# Patient Record
Sex: MALE | Race: WHITE | NOT HISPANIC OR LATINO
[De-identification: names, ages, dates, MRNs, and addresses within clinical notes are randomized per-mention and may not be internally consistent; named-entity substitution may affect disease eponyms.]

---

## 2018-03-07 PROBLEM — Z00.00 ENCOUNTER FOR PREVENTIVE HEALTH EXAMINATION: Status: ACTIVE | Noted: 2018-03-07

## 2018-04-06 ENCOUNTER — APPOINTMENT (OUTPATIENT)
Dept: NEUROLOGY | Facility: CLINIC | Age: 78
End: 2018-04-06
Payer: MEDICARE

## 2018-04-06 ENCOUNTER — FORM ENCOUNTER (OUTPATIENT)
Age: 78
End: 2018-04-06

## 2018-04-06 VITALS
HEIGHT: 70 IN | DIASTOLIC BLOOD PRESSURE: 80 MMHG | SYSTOLIC BLOOD PRESSURE: 130 MMHG | BODY MASS INDEX: 19.33 KG/M2 | WEIGHT: 135 LBS

## 2018-04-06 DIAGNOSIS — E78.5 HYPERLIPIDEMIA, UNSPECIFIED: ICD-10-CM

## 2018-04-06 DIAGNOSIS — Z78.9 OTHER SPECIFIED HEALTH STATUS: ICD-10-CM

## 2018-04-06 DIAGNOSIS — I10 ESSENTIAL (PRIMARY) HYPERTENSION: ICD-10-CM

## 2018-04-06 DIAGNOSIS — N40.0 BENIGN PROSTATIC HYPERPLASIA WITHOUT LOWER URINARY TRACT SYMPMS: ICD-10-CM

## 2018-04-06 PROCEDURE — 99204 OFFICE O/P NEW MOD 45 MIN: CPT

## 2018-04-07 ENCOUNTER — OUTPATIENT (OUTPATIENT)
Dept: OUTPATIENT SERVICES | Facility: HOSPITAL | Age: 78
LOS: 1 days | End: 2018-04-07
Payer: MEDICARE

## 2018-04-07 ENCOUNTER — APPOINTMENT (OUTPATIENT)
Dept: MRI IMAGING | Facility: CLINIC | Age: 78
End: 2018-04-07
Payer: MEDICARE

## 2018-04-07 DIAGNOSIS — F03.90 UNSPECIFIED DEMENTIA WITHOUT BEHAVIORAL DISTURBANCE: ICD-10-CM

## 2018-04-07 DIAGNOSIS — R26.9 UNSPECIFIED ABNORMALITIES OF GAIT AND MOBILITY: ICD-10-CM

## 2018-04-07 PROCEDURE — 70551 MRI BRAIN STEM W/O DYE: CPT

## 2018-04-07 PROCEDURE — 70551 MRI BRAIN STEM W/O DYE: CPT | Mod: 26

## 2018-05-22 ENCOUNTER — APPOINTMENT (OUTPATIENT)
Dept: NEUROLOGY | Facility: CLINIC | Age: 78
End: 2018-05-22
Payer: MEDICARE

## 2018-05-22 VITALS — HEART RATE: 73 BPM

## 2018-05-22 VITALS
HEIGHT: 70 IN | DIASTOLIC BLOOD PRESSURE: 82 MMHG | WEIGHT: 130 LBS | SYSTOLIC BLOOD PRESSURE: 140 MMHG | BODY MASS INDEX: 18.61 KG/M2

## 2018-05-22 PROCEDURE — 99214 OFFICE O/P EST MOD 30 MIN: CPT

## 2018-06-21 ENCOUNTER — MEDICATION RENEWAL (OUTPATIENT)
Age: 78
End: 2018-06-21

## 2018-11-12 ENCOUNTER — APPOINTMENT (OUTPATIENT)
Dept: NEUROLOGY | Facility: CLINIC | Age: 78
End: 2018-11-12
Payer: MEDICARE

## 2018-11-12 VITALS
DIASTOLIC BLOOD PRESSURE: 70 MMHG | HEIGHT: 70 IN | WEIGHT: 115 LBS | BODY MASS INDEX: 16.46 KG/M2 | SYSTOLIC BLOOD PRESSURE: 138 MMHG

## 2018-11-12 PROCEDURE — 99213 OFFICE O/P EST LOW 20 MIN: CPT

## 2018-11-20 ENCOUNTER — RX RENEWAL (OUTPATIENT)
Age: 78
End: 2018-11-20

## 2019-02-11 ENCOUNTER — APPOINTMENT (OUTPATIENT)
Dept: NEUROLOGY | Facility: CLINIC | Age: 79
End: 2019-02-11
Payer: MEDICARE

## 2019-02-11 VITALS
DIASTOLIC BLOOD PRESSURE: 90 MMHG | BODY MASS INDEX: 16.46 KG/M2 | SYSTOLIC BLOOD PRESSURE: 160 MMHG | HEIGHT: 70 IN | WEIGHT: 115 LBS

## 2019-02-11 PROCEDURE — 99213 OFFICE O/P EST LOW 20 MIN: CPT

## 2019-02-11 NOTE — ASSESSMENT
[FreeTextEntry1] : This is a 78-year-old man with dementia. I will continue donepezil 10 mg daily as his son states that his memory has been fairly stable. I've given him the name of an orthopedist that he visits his knee worsens. I will see him back in the office in 6 months, sooner should the need arise.

## 2019-02-11 NOTE — CONSULT LETTER
[Dear  ___] : Dear ~CHAPARRO, [Courtesy Letter:] : I had the pleasure of seeing your patient, [unfilled], in my office today. [Please see my note below.] : Please see my note below. [Consult Closing:] : Thank you very much for allowing me to participate in the care of this patient.  If you have any questions, please do not hesitate to contact me. [Sincerely,] : Sincerely, [FreeTextEntry3] : Aguilar Bartlett M.D., Ph.D. DPN-N\par [facility]\par Neurology at [place]\par Medical Director of Stroke Services\par [facility]\par

## 2019-02-11 NOTE — PHYSICAL EXAM
[Person] : oriented to person [Place] : oriented to place [Time] : oriented to time [Short Term Intact] : short term memory impaired [Remote Intact] : remote memory intact [Registration Intact] : recent registration memory intact [Span Intact] : the attention span was normal [Concentration Intact] : normal concentrating ability [Visual Intact] : visual attention was ~T not ~L decreased [Naming Objects] : no difficulty naming common objects [Repeating Phrases] : no difficulty repeating a phrase [Fluency] : fluency intact [Comprehension] : comprehension intact [Current Events] : inadequate knowledge of current events [Past History] : adequate knowledge of personal past history [Cranial Nerves Optic (II)] : visual acuity intact bilaterally,  visual fields full to confrontation, pupils equal round and reactive to light [Cranial Nerves Oculomotor (III)] : extraocular motion intact [Cranial Nerves Trigeminal (V)] : facial sensation intact symmetrically [Cranial Nerves Facial (VII)] : face symmetrical [Cranial Nerves Vestibulocochlear (VIII)] : hearing was intact bilaterally [Cranial Nerves Glossopharyngeal (IX)] : tongue and palate midline [Cranial Nerves Accessory (XI - Cranial And Spinal)] : head turning and shoulder shrug symmetric [Cranial Nerves Hypoglossal (XII)] : there was no tongue deviation with protrusion [Motor Tone] : muscle tone was normal in all four extremities [Motor Strength] : muscle strength was normal in all four extremities [Involuntary Movements] : no involuntary movements were seen [No Muscle Atrophy] : normal bulk in all four extremities [Motor Handedness Right-Handed] : the patient is right hand dominant [Paresis Pronator Drift Right-Sided] : no pronator drift on the right [Paresis Pronator Drift Left-Sided] : no pronator drift on the left [Sensation Tactile Decrease] : light touch was intact [Sensation Pain / Temperature Decrease] : pain and temperature was intact [Sensation Vibration Decrease] : vibration was intact [Proprioception] : proprioception was intact [Balance] : balance was intact [Tremor] : no tremor present [Coordination - Dysmetria Impaired Finger-to-Nose Bilateral] : not present [2+] : Patella left 2+ [FreeTextEntry4] : 0/3 recall, 0/3 with prompting [FreeTextEntry8] : not magnetic gait, Impaired because of right knee [Sclera] : the sclera and conjunctiva were normal [PERRL With Normal Accommodation] : pupils were equal in size, round, reactive to light, with normal accommodation [Extraocular Movements] : extraocular movements were intact [No APD] : no afferent pupillary defect [No JAIMIE] : no internuclear ophthalmoplegia [Full Visual Field] : full visual field

## 2019-02-11 NOTE — HISTORY OF PRESENT ILLNESS
[FreeTextEntry1] : Initial office visit April 6, 2018:\par This is a 77-year-old man who is here today a for evaluation of memory problems. He is accompanied by his son provides collaterals history. He is also having gait issues. His memory problems going on for at least a year but it seemed to be worse in the past few months. He forgets names forgets relationships forgets appointments he is gotten lost with driving. He can do some of his activities of daily living but not all. He has been unable to handle finances. He has limited insight to these deficits. He also has had a problem with walking but has been worse over the past week. This follows his hernia surgery. He reportedly had no complications during the surgery. He is here today for neurologic evaluation and treatment.\par \par Followup in November 12, 2018:\par This is a 78-year-old man presented for followup of memory issues. He is also having problem with gait which is now improved with physical therapy. He is here with his son today. His memory is roughly stable over the last 6 months. He is on donepezil 10 mg daily without much change. He's here today for neurologic followup.\par \par Followup February 11, 2019:\par This is a 78-year-old man who presents for followup. He is a history of dementia. He is here with his son who reports that his memory has been roughly stable since his last visit in November of 2018. He continues on donepezil 10 mg daily. He is having problems with his right knee, stating that clicking. It sounds like it's arthritic. He has had some physical therapy for it but hasn't had it recently. He is here today for neurologic followup.\par \par Followup May 22, 2018:\par This is a 77-year-old man who follows up today for dementia and gait issues. He is here with his son who states his memory has been fairly stable since his last visit. His gait is improving with physical therapy. He denies any incontinence of urine but states he has to go frequently. He had an MRI of his brain which will be referred to below. His son thinks that he is taking donepezil we did not have that on the intake sheet last month at his visit. He is here today for neurologic followup.

## 2019-02-11 NOTE — REVIEW OF SYSTEMS
[As Noted in HPI] : as noted in HPI [Confused or Disoriented] : confusion [Memory Lapses or Loss] : memory loss [Negative] : Heme/Lymph

## 2019-03-18 ENCOUNTER — APPOINTMENT (OUTPATIENT)
Dept: ORTHOPEDIC SURGERY | Facility: CLINIC | Age: 79
End: 2019-03-18
Payer: MEDICARE

## 2019-03-18 VITALS
TEMPERATURE: 98.2 F | WEIGHT: 158 LBS | DIASTOLIC BLOOD PRESSURE: 97 MMHG | HEIGHT: 70 IN | SYSTOLIC BLOOD PRESSURE: 175 MMHG | BODY MASS INDEX: 22.62 KG/M2

## 2019-03-18 DIAGNOSIS — M25.562 PAIN IN RIGHT KNEE: ICD-10-CM

## 2019-03-18 DIAGNOSIS — M17.11 UNILATERAL PRIMARY OSTEOARTHRITIS, RIGHT KNEE: ICD-10-CM

## 2019-03-18 DIAGNOSIS — M17.12 UNILATERAL PRIMARY OSTEOARTHRITIS, LEFT KNEE: ICD-10-CM

## 2019-03-18 DIAGNOSIS — M25.561 PAIN IN RIGHT KNEE: ICD-10-CM

## 2019-03-18 PROCEDURE — 99203 OFFICE O/P NEW LOW 30 MIN: CPT

## 2019-03-18 PROCEDURE — 73562 X-RAY EXAM OF KNEE 3: CPT | Mod: 50

## 2019-03-18 RX ORDER — OLMESARTAN MEDOXOMIL-HYDROCHLOROTHIAZIDE 25; 40 MG/1; MG/1
TABLET, FILM COATED ORAL
Refills: 0 | Status: ACTIVE | COMMUNITY

## 2019-03-18 NOTE — PHYSICAL EXAM
[LE] : Sensory: Intact in bilateral lower extremities [ALL] : dorsalis pedis, posterior tibial, femoral, popliteal, and radial 2+ and symmetric bilaterally [Antalgic] : antalgic [Poor Appearance] : well-appearing [Obese] : not obese [Acute Distress] : not in acute distress [de-identified] : GENERAL APPEARANCE: Well nourished and hydrated, pleasant, alert, and oriented x 3. Appears their stated age. \par HEENT: Normocephalic, extraocular eye motion intact. Nasal septum midline. Oral cavity clear. External auditory canal clear. \par RESPIRATORY: Breath sounds clear and audible in all lobes. No wheezing, No accessory muscle use.\par CARDIOVASCULAR: No apparent abnormalities. No lower leg edema. No varicosities. Pedal pulses are palpable.\par NEUROLOGIC: Sensation is normal, no muscle weakness in the upper or lower extremities.\par DERMATOLOGIC: No apparent skin lesions, moist, warm, no rash.\par SPINE: Cervical spine appears normal and moves freely; thoracic spine appears normal and moves freely; lumbosacral spine appears normal and moves freely, normal, nontender.\par MUSCULOSKELETAL: Hands, wrists, and elbows are normal and move freely, shoulders are normal and move freely.  [de-identified] : Bilateral knee exam shows ROM of 5-95 degree. slight valgus. no effusion, crepitus with ROM.\par Bilateral hip exam shows FROM, no pain with motion.\par \par  [de-identified] : 5V Xray of the bilateral done in office today and reviewed by Dr. 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

## 2019-03-18 NOTE — HISTORY OF PRESENT ILLNESS
[0] : a current pain level of 0/10 [Stable] : stable [Intermit.] : ~He/She~ states the symptoms seem to be intermittent [Sitting] : worsened by sitting [Walking] : worsened by walking [Knee Flexion] : worsened with knee flexion [de-identified] : 78 year old male presents for evaluation of bilateral knee clicking and clunking. He notes that he feels something is moving when he bends and when he gets in and out of his car. He also notes significant difficulty when attempting to stand up after sitting. He states that the pain is slight, and tolerable. The pain is currently 0/10 in severity and he denies pain today. When he has pain, it is not alleviated by anything he has attempted. He has attempted physical therapy in the past, with little relief. \par He owns a cane, but almost never uses it.  [de-identified] : stairs

## 2019-03-18 NOTE — REASON FOR VISIT
[Initial Visit] : an initial visit for [Knee Pain] : knee pain [Family Member] : family member [FreeTextEntry2] : Bilateral knee pain L>R

## 2019-03-18 NOTE — ADDENDUM
[FreeTextEntry1] : I, Aguilar Small, acted solely as a scribe for Dr. Reinier Ureña on this date 03/18/2019.

## 2019-03-18 NOTE — DISCUSSION/SUMMARY
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 We discussed the nature of the condition and the treatment options. I recommended the patient continue with exercise to maintain strength in the lower extremities. I recommended he attempt to walk frequently on flat surfaces for exercise. He can use rest and ice as needed for pain relief if he finds they help. I offered the patient cortisone injections in his bilateral knees, but he defers today.\par \par He will follow-up as needed for re-evaluation. \par \par

## 2019-11-18 ENCOUNTER — APPOINTMENT (OUTPATIENT)
Dept: NEUROLOGY | Facility: CLINIC | Age: 79
End: 2019-11-18
Payer: MEDICARE

## 2019-11-18 VITALS
DIASTOLIC BLOOD PRESSURE: 78 MMHG | BODY MASS INDEX: 24.05 KG/M2 | SYSTOLIC BLOOD PRESSURE: 130 MMHG | HEIGHT: 70 IN | WEIGHT: 168 LBS

## 2019-11-18 DIAGNOSIS — R26.9 UNSPECIFIED ABNORMALITIES OF GAIT AND MOBILITY: ICD-10-CM

## 2019-11-18 PROCEDURE — 99213 OFFICE O/P EST LOW 20 MIN: CPT

## 2019-11-18 NOTE — ASSESSMENT
[FreeTextEntry1] : This is an 79-year-old man with likely Alzheimer's dementia. He continues to have very slow memory. He will continue on donepezil 10 mg daily. On to add anything at this point. I will order home physical therapy to help with his gait. I will see him back in the office in 6 months, sooner should the need arise.

## 2019-11-18 NOTE — HISTORY OF PRESENT ILLNESS
[FreeTextEntry1] : Initial office visit April 6, 2018:\par This is a 77-year-old man who is here today a for evaluation of memory problems. He is accompanied by his son provides collaterals history. He is also having gait issues. His memory problems going on for at least a year but it seemed to be worse in the past few months. He forgets names forgets relationships forgets appointments he is gotten lost with driving. He can do some of his activities of daily living but not all. He has been unable to handle finances. He has limited insight to these deficits. He also has had a problem with walking but has been worse over the past week. This follows his hernia surgery. He reportedly had no complications during the surgery. He is here today for neurologic evaluation and treatment.\par \par Followup in November 12, 2018:\par This is a 78-year-old man presented for followup of memory issues. He is also having problem with gait which is now improved with physical therapy. He is here with his son today. His memory is roughly stable over the last 6 months. He is on donepezil 10 mg daily without much change. He's here today for neurologic followup.\par \par Followup February 11, 2019:\par This is a 78-year-old man who presents for followup. He is a history of dementia. He is here with his son who reports that his memory has been roughly stable since his last visit in November of 2018. He continues on donepezil 10 mg daily. He is having problems with his right knee, stating that clicking. It sounds like it's arthritic. He has had some physical therapy for it but hasn't had it recently. He is here today for neurologic followup.\par \par Followup May 22, 2018:\par This is a 77-year-old man who follows up today for dementia and gait issues. He is here with his son who states his memory has been fairly stable since his last visit. His gait is improving with physical therapy. He denies any incontinence of urine but states he has to go frequently. He had an MRI of his brain which will be referred to below. His son thinks that he is taking donepezil we did not have that on the intake sheet last month at his visit. He is here today for neurologic followup.\par \par Followup November 18, 2019:\par This is a 79-year-old man who presents today for followup of dementia stability. His son accompanies him as usual. His son does not report any major decline in memory over the period of time since he was last seen. He continues to have problems with walking in his knees in general. He uses a cane for balance. He is on donepezil 10 mg daily for dementia. He is here today for followup.

## 2019-11-18 NOTE — REVIEW OF SYSTEMS
[As Noted in HPI] : as noted in HPI [Confused or Disoriented] : confusion [Decr. Concentrating Ability] : decreased concentrating ability [Memory Lapses or Loss] : memory loss [Difficulty Walking] : difficulty walking [Repeating Questions] : repeated questioning about recent events [Difficulty with Language] : ~M difficulty with language [Negative] : Heme/Lymph

## 2019-11-18 NOTE — PHYSICAL EXAM
[Person] : oriented to person [Place] : oriented to place [Time] : disoriented to time [Short Term Intact] : short term memory impaired [Remote Intact] : remote memory intact [Registration Intact] : recent registration memory intact [Span Intact] : the attention span was normal [Visual Intact] : visual attention was ~T not ~L decreased [Concentration Intact] : normal concentrating ability [Naming Objects] : no difficulty naming common objects [Repeating Phrases] : no difficulty repeating a phrase [Fluency] : fluency intact [Comprehension] : comprehension intact [Current Events] : inadequate knowledge of current events [Past History] : adequate knowledge of personal past history [Cranial Nerves Optic (II)] : visual acuity intact bilaterally,  visual fields full to confrontation, pupils equal round and reactive to light [Cranial Nerves Oculomotor (III)] : extraocular motion intact [Cranial Nerves Trigeminal (V)] : facial sensation intact symmetrically [Cranial Nerves Vestibulocochlear (VIII)] : hearing was intact bilaterally [Cranial Nerves Facial (VII)] : face symmetrical [Cranial Nerves Glossopharyngeal (IX)] : tongue and palate midline [Cranial Nerves Accessory (XI - Cranial And Spinal)] : head turning and shoulder shrug symmetric [Cranial Nerves Hypoglossal (XII)] : there was no tongue deviation with protrusion [Involuntary Movements] : no involuntary movements were seen [Motor Tone] : muscle tone was normal in all four extremities [Motor Strength] : muscle strength was normal in all four extremities [No Muscle Atrophy] : normal bulk in all four extremities [Motor Handedness Right-Handed] : the patient is right hand dominant [Motor Strength Upper Extremities Bilaterally] : strength was normal in both upper extremities [Paresis Pronator Drift Right-Sided] : no pronator drift on the right [Paresis Pronator Drift Left-Sided] : no pronator drift on the left [Motor Strength Lower Extremities Bilaterally] : strength was normal in both lower extremities [Sensation Tactile Decrease] : light touch was intact [Sensation Pain / Temperature Decrease] : pain and temperature was intact [Sensation Vibration Decrease] : vibration was intact [Proprioception] : proprioception was intact [Balance] : balance was intact [Tremor] : no tremor present [Coordination - Dysmetria Impaired Finger-to-Nose Bilateral] : not present [2+] : Patella right 2+ [FreeTextEntry4] : 0/3 recall, 0/3 with prompting [FreeTextEntry8] : not magnetic gait, Impaired because of right knee using cane [Sclera] : the sclera and conjunctiva were normal [PERRL With Normal Accommodation] : pupils were equal in size, round, reactive to light, with normal accommodation [Extraocular Movements] : extraocular movements were intact [No APD] : no afferent pupillary defect [No JAIMIE] : no internuclear ophthalmoplegia [Full Visual Field] : full visual field

## 2019-11-18 NOTE — CONSULT LETTER
[Dear  ___] : Dear  [unfilled], [Please see my note below.] : Please see my note below. [Courtesy Letter:] : I had the pleasure of seeing your patient, [unfilled], in my office today. [Consult Closing:] : Thank you very much for allowing me to participate in the care of this patient.  If you have any questions, please do not hesitate to contact me. [Sincerely,] : Sincerely, [FreeTextEntry3] : Aguilar Bartlett M.D., Ph.D. DPN-N\par [facility]\par Neurology at [place]\par Medical Director of Stroke Services\par [facility]\par

## 2020-05-18 ENCOUNTER — APPOINTMENT (OUTPATIENT)
Dept: NEUROLOGY | Facility: CLINIC | Age: 80
End: 2020-05-18

## 2020-05-18 ENCOUNTER — APPOINTMENT (OUTPATIENT)
Dept: NEUROLOGY | Facility: CLINIC | Age: 80
End: 2020-05-18
Payer: MEDICARE

## 2020-05-18 PROCEDURE — 99213 OFFICE O/P EST LOW 20 MIN: CPT | Mod: 95

## 2020-05-18 NOTE — HISTORY OF PRESENT ILLNESS
[Home] : at home, [unfilled] , at the time of the visit. [Medical Office: ([facility])___] : at the medical office located in  [Other:____] : [unfilled] [FreeTextEntry2] : his son [FreeTextEntry1] : Initial office visit April 6, 2018:\par This is a 77-year-old man who is here today a for evaluation of memory problems. He is accompanied by his son provides collaterals history. He is also having gait issues. His memory problems going on for at least a year but it seemed to be worse in the past few months. He forgets names forgets relationships forgets appointments he is gotten lost with driving. He can do some of his activities of daily living but not all. He has been unable to handle finances. He has limited insight to these deficits. He also has had a problem with walking but has been worse over the past week. This follows his hernia surgery. He reportedly had no complications during the surgery. He is here today for neurologic evaluation and treatment.\par \par Followup in November 12, 2018:\par This is a 78-year-old man presented for followup of memory issues. He is also having problem with gait which is now improved with physical therapy. He is here with his son today. His memory is roughly stable over the last 6 months. He is on donepezil 10 mg daily without much change. He's here today for neurologic followup.\par \par Followup February 11, 2019:\par This is a 78-year-old man who presents for followup. He is a history of dementia. He is here with his son who reports that his memory has been roughly stable since his last visit in November of 2018. He continues on donepezil 10 mg daily. He is having problems with his right knee, stating that clicking. It sounds like it's arthritic. He has had some physical therapy for it but hasn't had it recently. He is here today for neurologic followup.\par \par Followup May 22, 2018:\par This is a 77-year-old man who follows up today for dementia and gait issues. He is here with his son who states his memory has been fairly stable since his last visit. His gait is improving with physical therapy. He denies any incontinence of urine but states he has to go frequently. He had an MRI of his brain which will be referred to below. His son thinks that he is taking donepezil we did not have that on the intake sheet last month at his visit. He is here today for neurologic followup.\par \par Followup November 18, 2019:\par This is a 79-year-old man who presents today for followup of dementia stability. His son accompanies him as usual. His son does not report any major decline in memory over the period of time since he was last seen. He continues to have problems with walking in his knees in general. He uses a cane for balance. He is on donepezil 10 mg daily for dementia. He is here today for followup.\par \par Followup May 18, 2019:\par This is an 79-year-old man who presents today for followup of dementia. He is accompanied by his son. This is done via video visit during the corona virus outbreak. Herbal consent was obtained from his son at the time of the visit. The son states that his father's memory has declined somewhat. He suffered more forgetful. He states that his gait is about the same. He does have a rolling walker but does not use it often. He seen today for followup of dementia.

## 2020-05-18 NOTE — ASSESSMENT
[FreeTextEntry1] : This is an 79-year-old man with advancing Alzheimer's disease. At this time I would like to Namenda to his Aricept. I have explained to the son how to titrate the medication and have sent a titration pack along with a standing 10 mg twice a day prescription. He will call me should the patient experience any side effects. I will see that him in followup, hopefully in the office in 3 months, sooner should the need arise.

## 2020-05-18 NOTE — PHYSICAL EXAM
[FreeTextEntry1] : Neurologic examination was limited due to the video call.\par \par Mental status: Awake and alert fully oriented to person place, not time. 0/3 register, 0/3 recall. difficulty naming objects. He is able to follow commands\par \par Cranial nerves:  Full extraocular movements. There is no nystagmus. Normal facial sensation and motor function. Tongue was in the midline.\par \par Motor: no pronator drift  bilaterally.\par \par Sensation: Light touch was intact \par \par Coordination: Deferred\par \par Gait: slow, slightly unsteady\par

## 2020-05-18 NOTE — CONSULT LETTER
[Courtesy Letter:] : I had the pleasure of seeing your patient, [unfilled], in my office today. [Dear  ___] : Dear  [unfilled], [Consult Closing:] : Thank you very much for allowing me to participate in the care of this patient.  If you have any questions, please do not hesitate to contact me. [Please see my note below.] : Please see my note below. [Sincerely,] : Sincerely, [FreeTextEntry3] : Aguilar Bartlett M.D., Ph.D. DPN-N\par [facility]\par Neurology at [place]\par [facility] Director [facility] St[facility]

## 2020-05-18 NOTE — REVIEW OF SYSTEMS
[As Noted in HPI] : as noted in HPI [Confused or Disoriented] : confusion [Memory Lapses or Loss] : memory loss [Difficulty with Language] : ~M difficulty with language [Negative] : Heme/Lymph

## 2020-08-21 ENCOUNTER — RX CHANGE (OUTPATIENT)
Age: 80
End: 2020-08-21

## 2020-08-21 RX ORDER — MEMANTINE HYDROCHLORIDE 10 MG/1
10 TABLET, FILM COATED ORAL
Qty: 60 | Refills: 5 | Status: DISCONTINUED | COMMUNITY
Start: 2020-05-18 | End: 2020-08-21

## 2021-04-01 ENCOUNTER — RX RENEWAL (OUTPATIENT)
Age: 81
End: 2021-04-01

## 2021-04-19 ENCOUNTER — APPOINTMENT (OUTPATIENT)
Dept: NEUROLOGY | Facility: CLINIC | Age: 81
End: 2021-04-19

## 2021-05-14 ENCOUNTER — APPOINTMENT (OUTPATIENT)
Dept: NEUROLOGY | Facility: CLINIC | Age: 81
End: 2021-05-14

## 2021-06-14 ENCOUNTER — APPOINTMENT (OUTPATIENT)
Dept: NEUROLOGY | Facility: CLINIC | Age: 81
End: 2021-06-14
Payer: MEDICARE

## 2021-06-14 DIAGNOSIS — F03.90 UNSPECIFIED DEMENTIA W/OUT BEHAVIORAL DISTURBANCE: ICD-10-CM

## 2021-06-14 PROCEDURE — 99214 OFFICE O/P EST MOD 30 MIN: CPT | Mod: 95

## 2021-06-14 RX ORDER — DONEPEZIL HYDROCHLORIDE 10 MG/1
10 TABLET ORAL
Qty: 30 | Refills: 5 | Status: ACTIVE | COMMUNITY
Start: 2018-11-20

## 2021-06-14 RX ORDER — MEMANTINE HYDROCHLORIDE 5 MG-10 MG
28 X 5 MG & KIT ORAL
Qty: 1 | Refills: 0 | Status: COMPLETED | COMMUNITY
Start: 2020-05-18 | End: 2021-06-14

## 2021-06-14 RX ORDER — MEMANTINE HYDROCHLORIDE 10 MG/1
10 TABLET, FILM COATED ORAL
Qty: 60 | Refills: 5 | Status: COMPLETED | COMMUNITY
Start: 2021-03-26 | End: 2021-06-14

## 2021-06-14 NOTE — CONSULT LETTER
[Dear  ___] : Dear  [unfilled], [Consult Letter:] : I had the pleasure of evaluating your patient, [unfilled]. [Please see my note below.] : Please see my note below. [Consult Closing:] : Thank you very much for allowing me to participate in the care of this patient.  If you have any questions, please do not hesitate to contact me. [Sincerely,] : Sincerely, [FreeTextEntry3] : Aguilar Bartlett M.D., Ph.D. DPN-N\par [facility]\par Neurology at [place]\par Medical Director of Stroke Services\par [facility]\par

## 2021-06-14 NOTE — PHYSICAL EXAM
[FreeTextEntry1] : Neurologic examination was limited due to the video call.\par \par Mental status: Awake and alert oriented to person only.  0/3 register/recall. There is no aphasia.\par \par Cranial nerves:  Full extraocular movements. There is no nystagmus. Normal facial motor function. Tongue was in the midline.\par \par Motor: no pronator drift  bilaterally.\par \par Sensation: Light touch was intact \par \par Coordination: Deferred\par \par Gait: Deferred\par

## 2021-06-14 NOTE — HISTORY OF PRESENT ILLNESS
[Home] : at home, [unfilled] , at the time of the visit. [Medical Office: ([facility])___] : at the medical office located in  [Other:____] : [unfilled] [Verbal consent obtained from patient] : the patient, [unfilled] [FreeTextEntry1] : Initial office visit April 6, 2018:\par This is a 77-year-old man who is here today a for evaluation of memory problems. He is accompanied by his son provides collaterals history. He is also having gait issues. His memory problems going on for at least a year but it seemed to be worse in the past few months. He forgets names forgets relationships forgets appointments he is gotten lost with driving. He can do some of his activities of daily living but not all. He has been unable to handle finances. He has limited insight to these deficits. He also has had a problem with walking but has been worse over the past week. This follows his hernia surgery. He reportedly had no complications during the surgery. He is here today for neurologic evaluation and treatment.\par \par Followup in November 12, 2018:\par This is a 78-year-old man presented for followup of memory issues. He is also having problem with gait which is now improved with physical therapy. He is here with his son today. His memory is roughly stable over the last 6 months. He is on donepezil 10 mg daily without much change. He's here today for neurologic followup.\par \par Followup February 11, 2019:\par This is a 78-year-old man who presents for followup. He is a history of dementia. He is here with his son who reports that his memory has been roughly stable since his last visit in November of 2018. He continues on donepezil 10 mg daily. He is having problems with his right knee, stating that clicking. It sounds like it's arthritic. He has had some physical therapy for it but hasn't had it recently. He is here today for neurologic followup.\par \par Followup May 22, 2018:\par This is a 77-year-old man who follows up today for dementia and gait issues. He is here with his son who states his memory has been fairly stable since his last visit. His gait is improving with physical therapy. He denies any incontinence of urine but states he has to go frequently. He had an MRI of his brain which will be referred to below. His son thinks that he is taking donepezil we did not have that on the intake sheet last month at his visit. He is here today for neurologic followup.\par \par Followup November 18, 2019:\par This is a 79-year-old man who presents today for followup of dementia stability. His son accompanies him as usual. His son does not report any major decline in memory over the period of time since he was last seen. He continues to have problems with walking in his knees in general. He uses a cane for balance. He is on donepezil 10 mg daily for dementia. He is here today for followup.\par \par Followup May 18, 2019:\par This is an 79-year-old man who presents today for followup of dementia. He is accompanied by his son. This is done via video visit during the corona virus outbreak. Herbal consent was obtained from his son at the time of the visit. The son states that his father's memory has declined somewhat. He suffered more forgetful. He states that his gait is about the same. He does have a rolling walker but does not use it often. He seen today for followup of dementia.\par \par Followup June 14, 2021:\par This is an 80-year-old man who presents today for followup of dementia. He is seen by video visit at his request. Consent was previous obtained for video visits. His son is present as well to provide additional history. Her son states that his memory has worsened since we saw him last year. He is continuing on Aricept and Namenda. He tolerates these medications well. He is not having significant behavioral disturbances. Other than decline in memory he's doing okay.

## 2021-06-14 NOTE — ASSESSMENT
[FreeTextEntry1] : This is a 80-year-old man with advancing dementia. At this point he is ready on dual therapy with Aricept and Namenda. We will continue this. He is not experiencing behavioral disturbances that would require additional medication. I will see him back in 6 months, sooner should the need arise.

## 2022-01-10 ENCOUNTER — RX RENEWAL (OUTPATIENT)
Age: 82
End: 2022-01-10

## 2022-12-22 ENCOUNTER — RX RENEWAL (OUTPATIENT)
Age: 82
End: 2022-12-22

## 2023-06-11 ENCOUNTER — RX RENEWAL (OUTPATIENT)
Age: 83
End: 2023-06-11

## 2023-07-05 ENCOUNTER — RX RENEWAL (OUTPATIENT)
Age: 83
End: 2023-07-05

## 2024-01-29 RX ORDER — MEMANTINE HYDROCHLORIDE 10 MG/1
10 TABLET, FILM COATED ORAL
Qty: 180 | Refills: 0 | Status: ACTIVE | COMMUNITY
Start: 2020-08-21 | End: 1900-01-01